# Patient Record
Sex: FEMALE | Race: ASIAN | NOT HISPANIC OR LATINO
[De-identification: names, ages, dates, MRNs, and addresses within clinical notes are randomized per-mention and may not be internally consistent; named-entity substitution may affect disease eponyms.]

---

## 2017-01-09 ENCOUNTER — CHARTING TRANS (OUTPATIENT)
Dept: OTHER | Age: 47
End: 2017-01-09

## 2017-03-13 ENCOUNTER — HOSPITAL (OUTPATIENT)
Dept: OTHER | Age: 47
End: 2017-03-13
Attending: INTERNAL MEDICINE

## 2017-04-11 ENCOUNTER — CHARTING TRANS (OUTPATIENT)
Dept: OTHER | Age: 47
End: 2017-04-11

## 2017-08-15 ENCOUNTER — HOSPITAL (OUTPATIENT)
Dept: OTHER | Age: 47
End: 2017-08-15
Attending: INTERNAL MEDICINE

## 2017-08-22 ENCOUNTER — CHARTING TRANS (OUTPATIENT)
Dept: OTHER | Age: 47
End: 2017-08-22

## 2017-09-01 ENCOUNTER — HOSPITAL (OUTPATIENT)
Dept: OTHER | Age: 47
End: 2017-09-01
Attending: INTERNAL MEDICINE

## 2017-09-13 ENCOUNTER — HOSPITAL (OUTPATIENT)
Dept: OTHER | Age: 47
End: 2017-09-13
Attending: INTERNAL MEDICINE

## 2017-10-01 ENCOUNTER — HOSPITAL (OUTPATIENT)
Dept: OTHER | Age: 47
End: 2017-10-01
Attending: INTERNAL MEDICINE

## 2017-10-04 ENCOUNTER — HOSPITAL (OUTPATIENT)
Dept: OTHER | Age: 47
End: 2017-10-04
Attending: INTERNAL MEDICINE

## 2017-11-01 ENCOUNTER — HOSPITAL (OUTPATIENT)
Dept: OTHER | Age: 47
End: 2017-11-01
Attending: INTERNAL MEDICINE

## 2017-11-06 ENCOUNTER — HOSPITAL (OUTPATIENT)
Dept: OTHER | Age: 47
End: 2017-11-06
Attending: INTERNAL MEDICINE

## 2017-11-27 ENCOUNTER — CHARTING TRANS (OUTPATIENT)
Dept: OTHER | Age: 47
End: 2017-11-27

## 2017-11-27 ENCOUNTER — LAB SERVICES (OUTPATIENT)
Dept: OTHER | Age: 47
End: 2017-11-27

## 2017-12-04 ENCOUNTER — CHARTING TRANS (OUTPATIENT)
Dept: OTHER | Age: 47
End: 2017-12-04

## 2017-12-06 ENCOUNTER — HOSPITAL (OUTPATIENT)
Dept: OTHER | Age: 47
End: 2017-12-06
Attending: ORTHOPAEDIC SURGERY

## 2017-12-06 LAB — PAP WITH HIGH RISK HPV: NORMAL

## 2017-12-21 ENCOUNTER — IMAGING SERVICES (OUTPATIENT)
Dept: OTHER | Age: 47
End: 2017-12-21

## 2017-12-21 ENCOUNTER — HOSPITAL (OUTPATIENT)
Dept: OTHER | Age: 47
End: 2017-12-21
Attending: OBSTETRICS & GYNECOLOGY

## 2017-12-22 ENCOUNTER — CHARTING TRANS (OUTPATIENT)
Dept: OTHER | Age: 47
End: 2017-12-22

## 2018-11-02 VITALS
SYSTOLIC BLOOD PRESSURE: 124 MMHG | DIASTOLIC BLOOD PRESSURE: 76 MMHG | WEIGHT: 118 LBS | BODY MASS INDEX: 23.79 KG/M2 | HEIGHT: 59 IN

## 2018-11-03 VITALS
BODY MASS INDEX: 23.39 KG/M2 | HEIGHT: 59 IN | WEIGHT: 116 LBS | SYSTOLIC BLOOD PRESSURE: 130 MMHG | DIASTOLIC BLOOD PRESSURE: 80 MMHG

## 2018-11-04 VITALS
WEIGHT: 116 LBS | HEIGHT: 59 IN | DIASTOLIC BLOOD PRESSURE: 90 MMHG | BODY MASS INDEX: 23.39 KG/M2 | SYSTOLIC BLOOD PRESSURE: 120 MMHG

## 2018-11-05 VITALS
HEIGHT: 59 IN | DIASTOLIC BLOOD PRESSURE: 80 MMHG | BODY MASS INDEX: 23.39 KG/M2 | SYSTOLIC BLOOD PRESSURE: 120 MMHG | WEIGHT: 116 LBS

## 2020-07-13 ENCOUNTER — OFFICE VISIT (OUTPATIENT)
Dept: OBGYN CLINIC | Facility: CLINIC | Age: 50
End: 2020-07-13
Payer: COMMERCIAL

## 2020-07-13 VITALS — WEIGHT: 105.81 LBS | DIASTOLIC BLOOD PRESSURE: 94 MMHG | SYSTOLIC BLOOD PRESSURE: 161 MMHG | HEART RATE: 67 BPM

## 2020-07-13 DIAGNOSIS — N94.6 DYSMENORRHEA: ICD-10-CM

## 2020-07-13 DIAGNOSIS — Z01.419 ENCOUNTER FOR GYNECOLOGICAL EXAMINATION: Primary | ICD-10-CM

## 2020-07-13 PROCEDURE — 3077F SYST BP >= 140 MM HG: CPT | Performed by: OBSTETRICS & GYNECOLOGY

## 2020-07-13 PROCEDURE — 99386 PREV VISIT NEW AGE 40-64: CPT | Performed by: OBSTETRICS & GYNECOLOGY

## 2020-07-13 PROCEDURE — 3080F DIAST BP >= 90 MM HG: CPT | Performed by: OBSTETRICS & GYNECOLOGY

## 2020-07-13 RX ORDER — TEMAZEPAM 15 MG/1
CAPSULE ORAL
COMMUNITY
Start: 2020-06-30

## 2020-07-13 RX ORDER — METHYLPREDNISOLONE 4 MG/1
TABLET ORAL
COMMUNITY
Start: 2020-06-30

## 2020-07-13 RX ORDER — HYDROXYZINE 50 MG/1
TABLET, FILM COATED ORAL
COMMUNITY
Start: 2020-06-30

## 2020-07-14 NOTE — PROGRESS NOTES
Dalia Olmstead is a 47year old female  Patient's last menstrual period was 2020. here for annual exam.       New pt. Here with daughter who translates. Changing doctor due to insurance. Menses q 26 days x 4 days.    Has back pain and cram breast pain, lumps, or discharge. Neurological:  denies headaches, extremity weakness or numbness. Psychiatric: denies depression or anxiety. Endocrine:   denies excessive thirst or urination. Heme/Lymph:  easy bruising or bleeding.     PHYSICAL EXAM:

## 2020-07-15 LAB — HPV I/H RISK 1 DNA SPEC QL NAA+PROBE: POSITIVE

## 2020-07-27 ENCOUNTER — TELEPHONE (OUTPATIENT)
Dept: OBGYN CLINIC | Facility: CLINIC | Age: 50
End: 2020-07-27

## 2020-07-27 NOTE — TELEPHONE ENCOUNTER
----- Message from Kelvin Gates MD sent at 7/23/2020  7:08 PM CDT -----  Pap-- ASCUS with positive HPV. Needs colposcopy.     Call pt

## 2020-07-28 NOTE — TELEPHONE ENCOUNTER
Patient scheduled colpo on 9/10/20 at 10:40 am, patient would like a call back to go over results, thank you.

## 2020-07-28 NOTE — TELEPHONE ENCOUNTER
Notified pt of results and recs. Answered all questions. Pt states she will look at her calendar and she will call back to schedule Colpo. Staff message sent to clinic pool for appt f/u.

## 2020-09-09 ENCOUNTER — TELEPHONE (OUTPATIENT)
Dept: OBGYN CLINIC | Facility: CLINIC | Age: 50
End: 2020-09-09

## 2020-09-10 NOTE — TELEPHONE ENCOUNTER
Pt stated that she started her period a few hours ago and it is like the \"beginning flow of a period\". Pt informed we would need to reschedule colpo. Pt stated that her bleeding has been a bit irregular and isnt sure when she will start bleeding again.  P

## 2020-09-22 ENCOUNTER — OFFICE VISIT (OUTPATIENT)
Dept: OBGYN CLINIC | Facility: CLINIC | Age: 50
End: 2020-09-22
Payer: COMMERCIAL

## 2020-09-22 VITALS — DIASTOLIC BLOOD PRESSURE: 84 MMHG | WEIGHT: 109.63 LBS | SYSTOLIC BLOOD PRESSURE: 131 MMHG | HEART RATE: 71 BPM

## 2020-09-22 DIAGNOSIS — R87.610 ASCUS WITH POSITIVE HIGH RISK HPV CERVICAL: ICD-10-CM

## 2020-09-22 DIAGNOSIS — R87.810 ASCUS WITH POSITIVE HIGH RISK HPV CERVICAL: ICD-10-CM

## 2020-09-22 PROCEDURE — 3075F SYST BP GE 130 - 139MM HG: CPT | Performed by: OBSTETRICS & GYNECOLOGY

## 2020-09-22 PROCEDURE — 3079F DIAST BP 80-89 MM HG: CPT | Performed by: OBSTETRICS & GYNECOLOGY

## 2020-09-22 PROCEDURE — 57454 BX/CURETT OF CERVIX W/SCOPE: CPT | Performed by: OBSTETRICS & GYNECOLOGY

## 2020-09-23 NOTE — PROCEDURES
Colpo w/Cx Biopsy and ECC      Consent signed. Procedure discussed with patient in detail including indication, risk, benefits, alternatives and complications.     Indication:  ASCUS with positive HPV    Procedure:  Under satisfactory analgesia, the sisi

## 2020-09-25 ENCOUNTER — TELEPHONE (OUTPATIENT)
Dept: OBGYN CLINIC | Facility: CLINIC | Age: 50
End: 2020-09-25

## 2020-09-25 NOTE — TELEPHONE ENCOUNTER
----- Message from Daisy Ferreira MD sent at 9/25/2020 11:54 AM CDT -----  Cervical bx-- ALISE 1. Repeat pap/HPV in 1 year.    Call pt

## 2021-10-20 ENCOUNTER — TELEPHONE (OUTPATIENT)
Dept: OBGYN CLINIC | Facility: CLINIC | Age: 51
End: 2021-10-20

## 2021-10-20 NOTE — TELEPHONE ENCOUNTER
Pt had colpo on 9/22/2020 with recs to return in one year for pap and hpv testing. No appt made. LMTCB. PSR:  When pt calls back, please help her schedule annual exam with [facility] in first available gyne slot.

## 2022-06-08 ENCOUNTER — OFFICE VISIT (OUTPATIENT)
Dept: OBGYN CLINIC | Facility: CLINIC | Age: 52
End: 2022-06-08
Payer: COMMERCIAL

## 2022-06-08 VITALS
DIASTOLIC BLOOD PRESSURE: 94 MMHG | HEIGHT: 60 IN | HEART RATE: 72 BPM | WEIGHT: 115.19 LBS | SYSTOLIC BLOOD PRESSURE: 147 MMHG | BODY MASS INDEX: 22.62 KG/M2

## 2022-06-08 DIAGNOSIS — N92.6 IRREGULAR PERIODS: ICD-10-CM

## 2022-06-08 DIAGNOSIS — Z01.419 ENCOUNTER FOR GYNECOLOGICAL EXAMINATION: Primary | ICD-10-CM

## 2022-06-08 DIAGNOSIS — Z12.4 SCREENING FOR MALIGNANT NEOPLASM OF CERVIX: ICD-10-CM

## 2022-06-08 PROCEDURE — 3008F BODY MASS INDEX DOCD: CPT | Performed by: OBSTETRICS & GYNECOLOGY

## 2022-06-08 PROCEDURE — 3080F DIAST BP >= 90 MM HG: CPT | Performed by: OBSTETRICS & GYNECOLOGY

## 2022-06-08 PROCEDURE — 3077F SYST BP >= 140 MM HG: CPT | Performed by: OBSTETRICS & GYNECOLOGY

## 2022-06-08 PROCEDURE — 99396 PREV VISIT EST AGE 40-64: CPT | Performed by: OBSTETRICS & GYNECOLOGY

## 2022-06-09 LAB — HPV I/H RISK 1 DNA SPEC QL NAA+PROBE: POSITIVE

## 2022-06-16 LAB
HPV16 DNA CVX QL PROBE+SIG AMP: NEGATIVE
HPV18 DNA CVX QL PROBE+SIG AMP: NEGATIVE

## 2022-07-14 ENCOUNTER — HOSPITAL ENCOUNTER (OUTPATIENT)
Dept: ULTRASOUND IMAGING | Age: 52
Discharge: HOME | End: 2022-07-14
Attending: OBSTETRICS & GYNECOLOGY
Payer: COMMERCIAL

## 2022-07-14 DIAGNOSIS — N92.6 IRREGULAR PERIODS: ICD-10-CM

## 2022-07-14 PROCEDURE — 76830 TRANSVAGINAL US NON-OB: CPT | Performed by: OBSTETRICS & GYNECOLOGY

## 2022-07-14 PROCEDURE — 76856 US EXAM PELVIC COMPLETE: CPT | Performed by: OBSTETRICS & GYNECOLOGY

## 2023-09-15 ENCOUNTER — PATIENT MESSAGE (OUTPATIENT)
Dept: OBGYN CLINIC | Facility: CLINIC | Age: 53
End: 2023-09-15

## 2023-10-04 NOTE — TELEPHONE ENCOUNTER
Pt was due for annual and pap in June. [employer] reminder was sent but pt did not read it. LMTCB to help schedule appt. PSR, when pt returns call please help book annual in first gyne with EFE. Thanks.

## 2023-10-26 ENCOUNTER — TELEPHONE (OUTPATIENT)
Dept: OBGYN CLINIC | Facility: CLINIC | Age: 53
End: 2023-10-26

## 2025-03-27 NOTE — TELEPHONE ENCOUNTER
Pt has questions on ASCUS pap result and +hpv. Answered all pt questions. Pt is appreciative. Encouraged to call back if she has more questions. Spoke with patient,  she on day 3 of MDP, has taken as much Ubrelvy as is recommended, had Toradol and decadron injections on 3/24 with relief for a few hours only to have migraine return.   Instructed patient to go to ED. Routed to provider

## (undated) NOTE — LETTER
10/26/2023              [address]        [facility] [address]         Dear LUIS,    This letter is to inform you that our office has made several attempts to reach you by [facility] and phone without success. We were attempting to contact you by phone regarding an overdue appointment and testing. Please contact our office at the number listed below as soon as you receive this letter to discuss this issue and to make the necessary changes in our system to your contact information. Thank you for your cooperation.         Sincerely,    Dr. Jaylon ARREDONDO-SANTIAGOKAIA [facility], Alondra Zambrano, [facility] - OB/GYN  [id number] [facility]  [phone]

## (undated) NOTE — LETTER
1/25/2022              Aruora Hamilton        [id number]        [facility] [address]         To Whom It May Concern,      Sincerely,    Estuardo Erickson MD  [address] [place] [address], [address] [place] [address]

## (undated) NOTE — LETTER
1/25/2022              [address]        [facility] [address]         Dear LUIS,    This letter is to inform you that our office has made several attempts to reach you by phone without success.   We were attempting

## (undated) NOTE — LETTER
AUTHORIZATION FOR SURGICAL OPERATION OR OTHER PROCEDURE    1.  I hereby authorize Dr. Bailey Armas, [facility] to my case to perform the following operation and/or procedure at the [facility]:    ___Colposcopy_______________________ ______________________________________________________  (please print)      Patient signature:  ___________________________________________________             Relationship to Patient:           []  Parent    Responsible person                          []